# Patient Record
Sex: FEMALE | Race: WHITE | NOT HISPANIC OR LATINO | URBAN - METROPOLITAN AREA
[De-identification: names, ages, dates, MRNs, and addresses within clinical notes are randomized per-mention and may not be internally consistent; named-entity substitution may affect disease eponyms.]

---

## 2017-07-14 ENCOUNTER — INPATIENT (INPATIENT)
Facility: HOSPITAL | Age: 27
LOS: 0 days | Discharge: HOME | DRG: 976 | End: 2017-07-15
Attending: INTERNAL MEDICINE | Admitting: INTERNAL MEDICINE
Payer: MEDICAID

## 2017-07-14 VITALS
TEMPERATURE: 98 F | HEIGHT: 65 IN | HEART RATE: 69 BPM | SYSTOLIC BLOOD PRESSURE: 90 MMHG | OXYGEN SATURATION: 99 % | DIASTOLIC BLOOD PRESSURE: 54 MMHG | WEIGHT: 121.92 LBS | RESPIRATION RATE: 16 BRPM

## 2017-07-14 DIAGNOSIS — R10.9 UNSPECIFIED ABDOMINAL PAIN: ICD-10-CM

## 2017-07-14 DIAGNOSIS — E86.0 DEHYDRATION: ICD-10-CM

## 2017-07-14 DIAGNOSIS — Z29.9 ENCOUNTER FOR PROPHYLACTIC MEASURES, UNSPECIFIED: ICD-10-CM

## 2017-07-14 DIAGNOSIS — Z21 ASYMPTOMATIC HUMAN IMMUNODEFICIENCY VIRUS [HIV] INFECTION STATUS: ICD-10-CM

## 2017-07-14 DIAGNOSIS — B86 SCABIES: ICD-10-CM

## 2017-07-14 LAB
ALBUMIN SERPL ELPH-MCNC: 3.6 G/DL — SIGNIFICANT CHANGE UP (ref 3.5–5)
ALP SERPL-CCNC: 57 U/L — SIGNIFICANT CHANGE UP (ref 40–120)
ALT FLD-CCNC: 14 U/L DA — SIGNIFICANT CHANGE UP (ref 10–60)
ANION GAP SERPL CALC-SCNC: 7 MMOL/L — SIGNIFICANT CHANGE UP (ref 5–17)
APPEARANCE UR: CLEAR — SIGNIFICANT CHANGE UP
APTT BLD: 24 SEC — LOW (ref 27.5–37.4)
AST SERPL-CCNC: 18 U/L — SIGNIFICANT CHANGE UP (ref 10–40)
BILIRUB SERPL-MCNC: 0.4 MG/DL — SIGNIFICANT CHANGE UP (ref 0.2–1.2)
BILIRUB UR-MCNC: NEGATIVE — SIGNIFICANT CHANGE UP
BUN SERPL-MCNC: 10 MG/DL — SIGNIFICANT CHANGE UP (ref 7–18)
CALCIUM SERPL-MCNC: 9 MG/DL — SIGNIFICANT CHANGE UP (ref 8.4–10.5)
CHLORIDE SERPL-SCNC: 107 MMOL/L — SIGNIFICANT CHANGE UP (ref 96–108)
CO2 SERPL-SCNC: 25 MMOL/L — SIGNIFICANT CHANGE UP (ref 22–31)
COLOR SPEC: YELLOW — SIGNIFICANT CHANGE UP
CREAT SERPL-MCNC: 0.63 MG/DL — SIGNIFICANT CHANGE UP (ref 0.5–1.3)
DIFF PNL FLD: NEGATIVE — SIGNIFICANT CHANGE UP
GLUCOSE SERPL-MCNC: 79 MG/DL — SIGNIFICANT CHANGE UP (ref 70–99)
GLUCOSE UR QL: NEGATIVE — SIGNIFICANT CHANGE UP
HCT VFR BLD CALC: 31.2 % — LOW (ref 39–50)
HGB BLD-MCNC: 10.8 G/DL — LOW (ref 13–17)
INR BLD: 1.01 RATIO — SIGNIFICANT CHANGE UP (ref 0.88–1.16)
KETONES UR-MCNC: NEGATIVE — SIGNIFICANT CHANGE UP
LACTATE SERPL-SCNC: 1.3 MMOL/L — SIGNIFICANT CHANGE UP (ref 0.7–2)
LEUKOCYTE ESTERASE UR-ACNC: NEGATIVE — SIGNIFICANT CHANGE UP
LIDOCAIN IGE QN: 145 U/L — SIGNIFICANT CHANGE UP (ref 73–393)
MCHC RBC-ENTMCNC: 29 PG — SIGNIFICANT CHANGE UP (ref 27–34)
MCHC RBC-ENTMCNC: 34.5 GM/DL — SIGNIFICANT CHANGE UP (ref 32–36)
MCV RBC AUTO: 84 FL — SIGNIFICANT CHANGE UP (ref 80–100)
NITRITE UR-MCNC: NEGATIVE — SIGNIFICANT CHANGE UP
PCP SPEC-MCNC: SIGNIFICANT CHANGE UP
PH UR: 7 — SIGNIFICANT CHANGE UP (ref 5–8)
PLATELET # BLD AUTO: 151 K/UL — SIGNIFICANT CHANGE UP (ref 150–400)
POTASSIUM SERPL-MCNC: 3.7 MMOL/L — SIGNIFICANT CHANGE UP (ref 3.5–5.3)
POTASSIUM SERPL-SCNC: 3.7 MMOL/L — SIGNIFICANT CHANGE UP (ref 3.5–5.3)
PROT SERPL-MCNC: 10.4 G/DL — HIGH (ref 6–8.3)
PROT UR-MCNC: NEGATIVE — SIGNIFICANT CHANGE UP
PROTHROM AB SERPL-ACNC: 11 SEC — SIGNIFICANT CHANGE UP (ref 9.8–12.7)
RBC # BLD: 3.71 M/UL — LOW (ref 4.2–5.8)
RBC # FLD: 15.3 % — HIGH (ref 10.3–14.5)
SODIUM SERPL-SCNC: 139 MMOL/L — SIGNIFICANT CHANGE UP (ref 135–145)
SP GR SPEC: 1.01 — SIGNIFICANT CHANGE UP (ref 1.01–1.02)
UROBILINOGEN FLD QL: NEGATIVE — SIGNIFICANT CHANGE UP
WBC # BLD: 3.6 K/UL — LOW (ref 3.8–10.5)
WBC # FLD AUTO: 3.6 K/UL — LOW (ref 3.8–10.5)

## 2017-07-14 PROCEDURE — 71010: CPT | Mod: 26

## 2017-07-14 PROCEDURE — 99285 EMERGENCY DEPT VISIT HI MDM: CPT | Mod: 25

## 2017-07-14 PROCEDURE — 74176 CT ABD & PELVIS W/O CONTRAST: CPT | Mod: 26

## 2017-07-14 RX ORDER — OXYCODONE AND ACETAMINOPHEN 5; 325 MG/1; MG/1
1 TABLET ORAL ONCE
Refills: 0 | Status: DISCONTINUED | OUTPATIENT
Start: 2017-07-14 | End: 2017-07-14

## 2017-07-14 RX ORDER — ONDANSETRON 8 MG/1
4 TABLET, FILM COATED ORAL ONCE
Refills: 0 | Status: COMPLETED | OUTPATIENT
Start: 2017-07-14 | End: 2017-07-14

## 2017-07-14 RX ORDER — PERMETHRIN CREAM 5% W/W 50 MG/G
1 CREAM TOPICAL ONCE
Refills: 0 | Status: COMPLETED | OUTPATIENT
Start: 2017-07-14 | End: 2017-07-14

## 2017-07-14 RX ORDER — ACETAMINOPHEN 500 MG
650 TABLET ORAL EVERY 6 HOURS
Refills: 0 | Status: DISCONTINUED | OUTPATIENT
Start: 2017-07-14 | End: 2017-07-15

## 2017-07-14 RX ORDER — SODIUM CHLORIDE 9 MG/ML
500 INJECTION INTRAMUSCULAR; INTRAVENOUS; SUBCUTANEOUS
Refills: 0 | Status: DISCONTINUED | OUTPATIENT
Start: 2017-07-14 | End: 2017-07-14

## 2017-07-14 RX ORDER — ACETAMINOPHEN WITH CODEINE 300MG-30MG
2 TABLET ORAL EVERY 4 HOURS
Refills: 0 | Status: DISCONTINUED | OUTPATIENT
Start: 2017-07-14 | End: 2017-07-15

## 2017-07-14 RX ORDER — SODIUM CHLORIDE 9 MG/ML
1000 INJECTION INTRAMUSCULAR; INTRAVENOUS; SUBCUTANEOUS
Refills: 0 | Status: DISCONTINUED | OUTPATIENT
Start: 2017-07-14 | End: 2017-07-15

## 2017-07-14 RX ORDER — ENOXAPARIN SODIUM 100 MG/ML
40 INJECTION SUBCUTANEOUS DAILY
Refills: 0 | Status: DISCONTINUED | OUTPATIENT
Start: 2017-07-14 | End: 2017-07-15

## 2017-07-14 RX ORDER — SODIUM CHLORIDE 9 MG/ML
3 INJECTION INTRAMUSCULAR; INTRAVENOUS; SUBCUTANEOUS ONCE
Refills: 0 | Status: COMPLETED | OUTPATIENT
Start: 2017-07-14 | End: 2017-07-14

## 2017-07-14 RX ORDER — DIPHENHYDRAMINE HCL 50 MG
25 CAPSULE ORAL ONCE
Refills: 0 | Status: COMPLETED | OUTPATIENT
Start: 2017-07-14 | End: 2017-07-14

## 2017-07-14 RX ADMIN — Medication 2 TABLET(S): at 22:31

## 2017-07-14 RX ADMIN — PERMETHRIN CREAM 5% W/W 1 APPLICATION(S): 50 CREAM TOPICAL at 22:32

## 2017-07-14 RX ADMIN — SODIUM CHLORIDE 2000 MILLILITER(S): 9 INJECTION INTRAMUSCULAR; INTRAVENOUS; SUBCUTANEOUS at 07:53

## 2017-07-14 RX ADMIN — ONDANSETRON 4 MILLIGRAM(S): 8 TABLET, FILM COATED ORAL at 07:51

## 2017-07-14 RX ADMIN — OXYCODONE AND ACETAMINOPHEN 1 TABLET(S): 5; 325 TABLET ORAL at 13:00

## 2017-07-14 RX ADMIN — OXYCODONE AND ACETAMINOPHEN 1 TABLET(S): 5; 325 TABLET ORAL at 09:23

## 2017-07-14 RX ADMIN — OXYCODONE AND ACETAMINOPHEN 1 TABLET(S): 5; 325 TABLET ORAL at 07:51

## 2017-07-14 RX ADMIN — Medication 25 MILLIGRAM(S): at 08:01

## 2017-07-14 RX ADMIN — Medication 2 TABLET(S): at 17:41

## 2017-07-14 RX ADMIN — SODIUM CHLORIDE 2000 MILLILITER(S): 9 INJECTION INTRAMUSCULAR; INTRAVENOUS; SUBCUTANEOUS at 07:54

## 2017-07-14 RX ADMIN — SODIUM CHLORIDE 3 MILLILITER(S): 9 INJECTION INTRAMUSCULAR; INTRAVENOUS; SUBCUTANEOUS at 07:46

## 2017-07-14 RX ADMIN — Medication 2 TABLET(S): at 23:15

## 2017-07-14 RX ADMIN — Medication 2 TABLET(S): at 18:35

## 2017-07-14 RX ADMIN — OXYCODONE AND ACETAMINOPHEN 1 TABLET(S): 5; 325 TABLET ORAL at 14:00

## 2017-07-14 NOTE — H&P ADULT - PROBLEM SELECTOR PLAN 3
Patient has exfoliating lesion on b/l hands  Patient is home less and has facial rash.   Permethrin rinse once to whole body

## 2017-07-14 NOTE — PHYSICAL THERAPY INITIAL EVALUATION ADULT - CRITERIA FOR SKILLED THERAPEUTIC INTERVENTIONS
impairments found/functional limitations in following categories/risk reduction/prevention/rehab potential/therapy frequency/predicted duration of therapy intervention/anticipated equipment needs at discharge/anticipated discharge recommendation

## 2017-07-14 NOTE — PHYSICAL THERAPY INITIAL EVALUATION ADULT - PERTINENT HX OF CURRENT PROBLEM, REHAB EVAL
Pt. admitted due to abdominal pain x 2 weeks already. Pt. is homeless and has electric chair to use to get around. Pt. has hx of HIV (since birth) and no meds'; Lymphoma diagnosed on 2012, seizures, Shingles and Viral Meningitis.

## 2017-07-14 NOTE — PHYSICAL THERAPY INITIAL EVALUATION ADULT - PATIENT/FAMILY/SIGNIFICANT OTHER GOALS STATEMENT, PT EVAL
Pt. states wants to have a place to live and  to gain however much strength to help himself and enjoy life.

## 2017-07-14 NOTE — H&P ADULT - NSHPLABSRESULTS_GEN_ALL_CORE
10.8   3.6   )-----------( 151      ( 2017 11:05 )             31.2   07-14    139  |  107  |  10  ----------------------------<  79  3.7   |  25  |  0.63    Ca    9.0      2017 07:53    TPro  10.4<H>  /  Alb  3.6  /  TBili  0.4  /  DBili  x   /  AST  18  /  ALT  14  /  AlkPhos  57  07-14    Urinalysis Basic - ( 2017 06:49 )    Color: Yellow / Appearance: Clear / S.010 / pH: x  Gluc: x / Ketone: Negative  / Bili: Negative / Urobili: Negative   Blood: x / Protein: Negative / Nitrite: Negative   Leuk Esterase: Negative / RBC: x / WBC x   Sq Epi: x / Non Sq Epi: x / Bacteria: x

## 2017-07-14 NOTE — H&P ADULT - PROBLEM SELECTOR PLAN 1
Likely secondary to lymphoma or HIV  Percocet/Tylenol for pain  Avoid morphine as BP runs in 100's  F/u hepatitis panel, Syphilis screen, cryptococcal antigen and QuantiFeron as patient is immunocompromised  Patient is tolerating Diet well.   F/u CT abdomen to rule out metastasis

## 2017-07-14 NOTE — H&P ADULT - ASSESSMENT
27 year old homeless wheel chair M with PMH of HIV (since birth, not on medication), Lymphoma (diagnosed in 2012, got treatment with chemo port, not sure if completed or not, seizures (was discharged on dilantin but never took it), shingles, LE contractures and viral meningitis presented to ED with abdominal pain. Patient states that he is having abdominal pain since last 2 months, not associated with food. Patient also complains of subjective fever, chills, dry cough. Patient never documented the fever and states that his BP runs in 100's. Denies h/o headache, nausea, vomiting, sick contacts, travel outside [place], constipation or diarrhea.

## 2017-07-14 NOTE — H&P ADULT - HISTORY OF PRESENT ILLNESS
27 year old homeless wheel chair M with PMH of HIV (since birth, not on medication), Lymphoma (diagnosed in 2012, got treatment with chemo port, not sure if completed or not, seizures (was discharged on dilantin but never took it), shingles, LE contractures and viral meningitis presented to ED with abdominal pain. Patient states that he is having abdominal pain since last 2 months, not associated with food. Patient also complains of subjective fever, chills, dry cough. Patient never documented the fever and states that his BP runs in 100's. Denies h/o headache, nausea, vomiting, sick contacts, travel outside [place], constipation or diarrhea.  Denies smoking, alcohol and illicit drug use. No PCP    ED course: Patient is comfortably lying on bed, not in acute respiratory distress s/p 2L bolus. 27 year old homeless wheel chair M with PMH of HIV (since birth, not on medication), Lymphoma (diagnosed in 2012, got treatment with chemo port, not sure if completed or not, seizures (was discharged on dilantin but never took it), shingles, LE contractures and viral meningitis presented to ED with abdominal pain. Patient states that he is having abdominal pain since last 2 months, not associated with food. Patient also complains of subjective fever, chills, dry cough. Patient never documented the fever and states that his BP runs in 100's.   Denies h/o headache, nausea, vomiting, sick contacts, travel outside [place], constipation or diarrhea.  Denies smoking, alcohol and illicit drug use. No PCP    ED course: Patient is comfortably lying on bed, not in acute respiratory distress s/p 2L bolus.

## 2017-07-14 NOTE — PHYSICAL THERAPY INITIAL EVALUATION ADULT - GENERAL OBSERVATIONS, REHAB EVAL
Pt. seen for PT Eval; pt. received in side lying; (+) IV line; Pt. presents w/ bilateral knee flexion contracture; pain on left abdominal area.

## 2017-07-14 NOTE — ED PROVIDER NOTE - MEDICAL DECISION MAKING DETAILS
28 y/o M pt presents with unmanaged HIV, likely AIDS. Pt is homeless without any social or medical support. Will check labs, CT scans, give fluids, likely admission.

## 2017-07-14 NOTE — H&P ADULT - PROBLEM SELECTOR PLAN 2
H/o HIV since birth, not on any medication  F/u CD4 count, HIV viral load  F/u hepatitis panel, Syphilis screen, cryptococcal antigen and QuantiFeron as patient is immunocompromised

## 2017-07-14 NOTE — H&P ADULT - ATTENDING COMMENTS
I have interviewed and examined this patient at the bedside and discussed his care with Dr. Galvez.   He is a 28 yo man who gives a history of HIV infection since birth.  He was raised by his grandmother, who is now .  He gives a history of several complications of HIV infection, including pneumonia and lymphoma.  He states that he was treated at several different hospitals, but has not followed up with outpatient care.  He has not been on antiretrovirals since .     He is an alert, articulate 28 yo man who disheveled and dirty, but in NAD  Vital Signs Last 24 Hrs  T(C): 36.4 (2017 17:19), Max: 36.8 (2017 07:33)  T(F): 97.6 (2017 17:19), Max: 98.3 (2017 07:33)  HR: 70 (2017 17:19) (59 - 70)  BP: 89/49 (2017 17:19) (89/49 - 95/53)  BP(mean): 57 (2017 17:19) (57 - 57)  RR: 16 (2017 17:19) (16 - 17)  SpO2: 100% (2017 17:19) (98% - 100%)  Skin:  irregular pigmentation dark and light, exfoliation of skin on his hands and intertriginous areas of the feet.     Poor dentition  Lungs, clear  Cor, RRR  Nodes, none appreciated  Abdomen, midline scar, healed by secondary intention  Neurolgical, contractures, LE    IMP:  History of HIV.  Needs confirmation and history of lymphoma treatment.           Undomiciled           Contracture deformity of LE, ? history of neurologic disease           Skin lesions, r/o scabies, syphilis           Nutritional deficiency is likely.  Plan:  Baseline studies for HIV associated infections.            History, records, if possible.            Nutritional parameters.           SW consult.            Consult PT           Treat empirically for scabies.

## 2017-07-14 NOTE — ED PROVIDER NOTE - OBJECTIVE STATEMENT
26 y/o M pt (wheelchair-bound) with PMHx of HIV and Lymphoma and no significant PSHx presents to ED c/o intermittent (waxing and waning in intensity) subjective fever, chills, abd pain, weight loss, cough, and nausea for a long period of time (>1 year). Pt reports he is homeless, receives no medical care, and has never been to [facility]; pt generally stays in [place], however tonight, he decided to visit [facility] instead of his usual place of care for unclear reasons. Pt states he usually improves after being hospitalized; at institutions the pt has been to in the past, pt has been d/c however he has never f/u with medical or , resulting in ultimate decompensation until the pt requires admission again. Pt denies any other complaints. Allergies: Levaquin (unknown).

## 2017-07-15 ENCOUNTER — EMERGENCY (EMERGENCY)
Facility: HOSPITAL | Age: 27
LOS: 1 days | Discharge: HOME | End: 2017-07-15
Payer: MEDICAID

## 2017-07-15 VITALS
OXYGEN SATURATION: 100 % | RESPIRATION RATE: 16 BRPM | HEART RATE: 78 BPM | TEMPERATURE: 100 F | SYSTOLIC BLOOD PRESSURE: 103 MMHG | DIASTOLIC BLOOD PRESSURE: 56 MMHG

## 2017-07-15 VITALS
HEART RATE: 77 BPM | SYSTOLIC BLOOD PRESSURE: 94 MMHG | HEIGHT: 67 IN | WEIGHT: 100.09 LBS | OXYGEN SATURATION: 98 % | DIASTOLIC BLOOD PRESSURE: 59 MMHG | TEMPERATURE: 99 F | RESPIRATION RATE: 20 BRPM

## 2017-07-15 VITALS
HEART RATE: 74 BPM | RESPIRATION RATE: 16 BRPM | OXYGEN SATURATION: 99 % | DIASTOLIC BLOOD PRESSURE: 64 MMHG | SYSTOLIC BLOOD PRESSURE: 110 MMHG | TEMPERATURE: 99 F

## 2017-07-15 DIAGNOSIS — Z88.1 ALLERGY STATUS TO OTHER ANTIBIOTIC AGENTS STATUS: ICD-10-CM

## 2017-07-15 DIAGNOSIS — G89.29 OTHER CHRONIC PAIN: ICD-10-CM

## 2017-07-15 DIAGNOSIS — Z21 ASYMPTOMATIC HUMAN IMMUNODEFICIENCY VIRUS [HIV] INFECTION STATUS: ICD-10-CM

## 2017-07-15 DIAGNOSIS — R26.2 DIFFICULTY IN WALKING, NOT ELSEWHERE CLASSIFIED: ICD-10-CM

## 2017-07-15 DIAGNOSIS — K08.89 OTHER SPECIFIED DISORDERS OF TEETH AND SUPPORTING STRUCTURES: ICD-10-CM

## 2017-07-15 LAB
ALBUMIN SERPL ELPH-MCNC: 3.2 G/DL — LOW (ref 3.5–5)
ALP SERPL-CCNC: 49 U/L — SIGNIFICANT CHANGE UP (ref 40–120)
ALT FLD-CCNC: 12 U/L DA — SIGNIFICANT CHANGE UP (ref 10–60)
ANION GAP SERPL CALC-SCNC: 6 MMOL/L — SIGNIFICANT CHANGE UP (ref 5–17)
AST SERPL-CCNC: 13 U/L — SIGNIFICANT CHANGE UP (ref 10–40)
BILIRUB SERPL-MCNC: 0.3 MG/DL — SIGNIFICANT CHANGE UP (ref 0.2–1.2)
BUN SERPL-MCNC: 12 MG/DL — SIGNIFICANT CHANGE UP (ref 7–18)
CALCIUM SERPL-MCNC: 8.3 MG/DL — LOW (ref 8.4–10.5)
CHLORIDE SERPL-SCNC: 108 MMOL/L — SIGNIFICANT CHANGE UP (ref 96–108)
CHOLEST SERPL-MCNC: 130 MG/DL — SIGNIFICANT CHANGE UP (ref 10–199)
CO2 SERPL-SCNC: 27 MMOL/L — SIGNIFICANT CHANGE UP (ref 22–31)
CREAT SERPL-MCNC: 0.63 MG/DL — SIGNIFICANT CHANGE UP (ref 0.5–1.3)
CRYPTOC AG FLD QL: NEGATIVE — SIGNIFICANT CHANGE UP
CULTURE RESULTS: NO GROWTH — SIGNIFICANT CHANGE UP
ERYTHROCYTE [SEDIMENTATION RATE] IN BLOOD: 64 MM/HR — HIGH (ref 0–15)
GLUCOSE SERPL-MCNC: 109 MG/DL — HIGH (ref 70–99)
HAV IGG+IGM SER QL: SIGNIFICANT CHANGE UP
HBA1C BLD-MCNC: 5.1 % — SIGNIFICANT CHANGE UP (ref 4–5.6)
HBV CORE AB SER-ACNC: SIGNIFICANT CHANGE UP
HBV SURFACE AB SER-ACNC: SIGNIFICANT CHANGE UP
HBV SURFACE AG SER-ACNC: SIGNIFICANT CHANGE UP
HCT VFR BLD CALC: 31.5 % — LOW (ref 39–50)
HCV AB S/CO SERPL IA: 0.14 S/CO — SIGNIFICANT CHANGE UP
HCV AB SERPL-IMP: SIGNIFICANT CHANGE UP
HDLC SERPL-MCNC: 28 MG/DL — LOW (ref 40–125)
HGB BLD-MCNC: 10.6 G/DL — LOW (ref 13–17)
LIPID PNL WITH DIRECT LDL SERPL: 87 MG/DL — SIGNIFICANT CHANGE UP
MAGNESIUM SERPL-MCNC: 2.5 MG/DL — SIGNIFICANT CHANGE UP (ref 1.6–2.6)
MCHC RBC-ENTMCNC: 28.7 PG — SIGNIFICANT CHANGE UP (ref 27–34)
MCHC RBC-ENTMCNC: 33.8 GM/DL — SIGNIFICANT CHANGE UP (ref 32–36)
MCV RBC AUTO: 84.9 FL — SIGNIFICANT CHANGE UP (ref 80–100)
PHOSPHATE SERPL-MCNC: 2.8 MG/DL — SIGNIFICANT CHANGE UP (ref 2.5–4.5)
PLATELET # BLD AUTO: 174 K/UL — SIGNIFICANT CHANGE UP (ref 150–400)
POTASSIUM SERPL-MCNC: 3.8 MMOL/L — SIGNIFICANT CHANGE UP (ref 3.5–5.3)
POTASSIUM SERPL-SCNC: 3.8 MMOL/L — SIGNIFICANT CHANGE UP (ref 3.5–5.3)
PROT SERPL-MCNC: 9.3 G/DL — HIGH (ref 6–8.3)
RBC # BLD: 3.71 M/UL — LOW (ref 4.2–5.8)
RBC # FLD: 15.4 % — HIGH (ref 10.3–14.5)
SODIUM SERPL-SCNC: 141 MMOL/L — SIGNIFICANT CHANGE UP (ref 135–145)
SPECIMEN SOURCE: SIGNIFICANT CHANGE UP
T PALLIDUM AB TITR SER: NEGATIVE — SIGNIFICANT CHANGE UP
TOTAL CHOLESTEROL/HDL RATIO MEASUREMENT: 4.6 RATIO — SIGNIFICANT CHANGE UP (ref 3.4–9.6)
TRIGL SERPL-MCNC: 73 MG/DL — SIGNIFICANT CHANGE UP (ref 10–149)
TSH SERPL-MCNC: 1.16 UU/ML — SIGNIFICANT CHANGE UP (ref 0.34–4.82)
WBC # BLD: 3.8 K/UL — SIGNIFICANT CHANGE UP (ref 3.8–10.5)
WBC # FLD AUTO: 3.8 K/UL — SIGNIFICANT CHANGE UP (ref 3.8–10.5)

## 2017-07-15 PROCEDURE — 99283 EMERGENCY DEPT VISIT LOW MDM: CPT | Mod: 25

## 2017-07-15 PROCEDURE — 96372 THER/PROPH/DIAG INJ SC/IM: CPT

## 2017-07-15 PROCEDURE — 99285 EMERGENCY DEPT VISIT HI MDM: CPT | Mod: 25

## 2017-07-15 PROCEDURE — 99284 EMERGENCY DEPT VISIT MOD MDM: CPT

## 2017-07-15 RX ORDER — IBUPROFEN 200 MG
600 TABLET ORAL ONCE
Refills: 0 | Status: COMPLETED | OUTPATIENT
Start: 2017-07-15 | End: 2017-07-15

## 2017-07-15 RX ORDER — KETOROLAC TROMETHAMINE 30 MG/ML
15 SYRINGE (ML) INJECTION ONCE
Refills: 0 | Status: DISCONTINUED | OUTPATIENT
Start: 2017-07-15 | End: 2017-07-15

## 2017-07-15 RX ORDER — DIPHENHYDRAMINE HCL 50 MG
25 CAPSULE ORAL ONCE
Refills: 0 | Status: COMPLETED | OUTPATIENT
Start: 2017-07-15 | End: 2017-07-15

## 2017-07-15 RX ADMIN — Medication 15 MILLIGRAM(S): at 18:35

## 2017-07-15 RX ADMIN — Medication 25 MILLIGRAM(S): at 00:56

## 2017-07-15 RX ADMIN — Medication 600 MILLIGRAM(S): at 11:52

## 2017-07-15 RX ADMIN — Medication 600 MILLIGRAM(S): at 10:53

## 2017-07-15 RX ADMIN — Medication 15 MILLIGRAM(S): at 19:10

## 2017-07-15 NOTE — DISCHARGE NOTE ADULT - HOSPITAL COURSE
27 year old homeless wheel chair M with PMH of HIV (since birth, not on medication), Lymphoma (diagnosed in 2012, got treatment with chemo port, not sure if completed or not, seizures (was discharged on dilantin but never took it), shingles, LE contractures and viral meningitis presented to ED with abdominal pain. Patient states that he is having abdominal pain since last 2 months, not associated with food. Patient also complains of subjective fever, chills, dry cough. Patient never documented the fever and states that his BP runs in 100's.   Denies h/o headache, nausea, vomiting, sick contacts, travel outside [place], constipation or diarrhea.  Denies smoking, alcohol and illicit drug use. No PCP   Abdominal pain was addressed by Percocet/Tylenol for pain; morphine avoided as BP runs in 100's! Abdominal CT was negative for any abdominal pathology.  inpatient enoxaparin was given for dvt prophylaxis.  Patient is stable and ready to be discharged.

## 2017-07-15 NOTE — DISCHARGE NOTE ADULT - CARE PLAN
Principal Discharge DX:	Abdominal pain, unspecified location  Goal:	Abdominal CT was done and was negative for any abdominal pathology  Instructions for follow-up, activity and diet:	patient does not have a PCP and hence was advised to come to the ER if anything similar happens in the future.

## 2017-07-15 NOTE — DISCHARGE NOTE ADULT - PLAN OF CARE
Abdominal CT was done and was negative for any abdominal pathology patient does not have a PCP and hence was advised to come to the ER if anything similar happens in the future.

## 2017-07-15 NOTE — ED PROVIDER NOTE - MEDICAL DECISION MAKING DETAILS
Pt w/ back and neck pain requesting morphine; explained that opiates w/ not be prescribed for chronic pain. Will give Toradol for tooth and neck pain. Due to pt ambulatory dysfunction, will set up Amb[facility] to assist pt getting home.

## 2017-07-15 NOTE — ED PROVIDER NOTE - PROGRESS NOTE DETAILS
The scribes documentation has been prepared under my direct and personally reviewed by me in its entirety. I confirm that the note above accurately reflects all work, treatment, procedures, and medical decision making performed by me [Ashley Joshua NP].

## 2017-07-15 NOTE — ED ADULT NURSE NOTE - OBJECTIVE STATEMENT
c/o neck and back pain patient on electric wheel chair, was discharged from hospital today came back c/o pain, denies any recent injury

## 2017-07-15 NOTE — ED ADULT NURSE NOTE - CHIEF COMPLAINT QUOTE
Pt c/o pain in mouth and back and neck pain. No recent injury. Has motorized wheelchair. Was just discharged from [address] [place] [address]. Was demanding Morphine at that time. Was discharged.

## 2017-07-15 NOTE — ED ADULT TRIAGE NOTE - CHIEF COMPLAINT QUOTE
Pt c/o pain in mouth and back and neck pain. No recent injury. Has motorized wheelchair. Was just discharged from [address] [place] [address]. Was demanding Morphine at that time. Was discharged.

## 2017-07-15 NOTE — DISCHARGE NOTE ADULT - PATIENT PORTAL LINK FT
“You can access the [facility] Patient Portal, offered by [facility], by registering with the following website: [email or web address]”

## 2017-07-15 NOTE — ED PROVIDER NOTE - OBJECTIVE STATEMENT
26 y/o M pt, wheelchair bound due to contractors, w/ PMHx of HIV and lymphoma returns to the ED same day of discharge c/o back and neck pain. Pt was in the hospital yesterday for abd. pain, nausea and vomiting; returns today for back and neck pain. Pt requesting assistance to get home, stating that he was not set up w/ means to get home yesterday. Pt was not sent home w/ pain medicine. Denies trauma, bowel/bladder dysfunction,  weakness, weight loss, saddle anesthesia or any other complaints. NKDA. 28 y/o M pt, wheelchair bound due to contractions, w/ PMHx of HIV and lymphoma returns to the ED same day of discharge c/o chronic neck, back and tooth pain. Pt was in the hospital yesterday for abd. pain, nausea and vomiting, d/c home. Pt requesting assistance to get home, stating that he was not set up w/ means to get home yesterday. Pt also c/o not being sent home w/ pain medicine. Denies trauma, bowel/bladder dysfunction,  weakness, weight loss, saddle anesthesia or any other complaints. NKDA.

## 2017-07-15 NOTE — ED PROVIDER NOTE - PHYSICAL EXAMINATION
Pt wheelchair bound, poor dentition, multiple caries, missing teeth, lower extremity contractors b/l

## 2017-07-16 LAB
HIV-1 VIRAL LOAD RESULT: ABNORMAL
HIV1 RNA # SERPL NAA+PROBE: SIGNIFICANT CHANGE UP
HIV1 RNA SER-IMP: SIGNIFICANT CHANGE UP
HIV1 RNA SERPL NAA+PROBE-ACNC: ABNORMAL
HIV1 RNA SERPL NAA+PROBE-LOG#: 5.35 — SIGNIFICANT CHANGE UP

## 2017-07-17 LAB
4/8 RATIO: 0.17 RATIO — LOW (ref 0.9–3.6)
ABS CD8: 842 /UL — HIGH (ref 136–757)
CD16+CD56+ CELLS NFR BLD: 10 % — SIGNIFICANT CHANGE UP (ref 4–25)
CD16+CD56+ CELLS NFR SPEC: 144 /UL — SIGNIFICANT CHANGE UP (ref 64–494)
CD19 BLASTS SPEC-ACNC: 24 % — HIGH (ref 5–22)
CD19 BLASTS SPEC-ACNC: 341 /UL — SIGNIFICANT CHANGE UP (ref 68–528)
CD3 BLASTS SPEC-ACNC: 66 % — SIGNIFICANT CHANGE UP (ref 59–85)
CD3 BLASTS SPEC-ACNC: 974 /UL — SIGNIFICANT CHANGE UP (ref 799–2171)
CD4 %: 9 % — LOW (ref 36–65)
CD8 %: 56 % — HIGH (ref 11–36)
HIV 1+2 AB+HIV1 P24 AG SERPL QL IA: REACTIVE
T-CELL CD4 SUBSET PNL BLD: 140 /UL — LOW (ref 489–1457)

## 2017-07-18 DIAGNOSIS — Z99.3 DEPENDENCE ON WHEELCHAIR: ICD-10-CM

## 2017-07-18 DIAGNOSIS — Z88.1 ALLERGY STATUS TO OTHER ANTIBIOTIC AGENTS STATUS: ICD-10-CM

## 2017-07-18 DIAGNOSIS — R10.9 UNSPECIFIED ABDOMINAL PAIN: ICD-10-CM

## 2017-07-18 DIAGNOSIS — Z92.21 PERSONAL HISTORY OF ANTINEOPLASTIC CHEMOTHERAPY: ICD-10-CM

## 2017-07-18 DIAGNOSIS — E86.0 DEHYDRATION: ICD-10-CM

## 2017-07-18 DIAGNOSIS — C85.90 NON-HODGKIN LYMPHOMA, UNSPECIFIED, UNSPECIFIED SITE: ICD-10-CM

## 2017-07-18 DIAGNOSIS — Z59.0 HOMELESSNESS: ICD-10-CM

## 2017-07-18 DIAGNOSIS — B86 SCABIES: ICD-10-CM

## 2017-07-18 DIAGNOSIS — Z21 ASYMPTOMATIC HUMAN IMMUNODEFICIENCY VIRUS [HIV] INFECTION STATUS: ICD-10-CM

## 2017-07-18 LAB
M TB TUBERC IFN-G BLD QL: 0 IU/ML — SIGNIFICANT CHANGE UP
M TB TUBERC IFN-G BLD QL: 0.04 IU/ML — SIGNIFICANT CHANGE UP
M TB TUBERC IFN-G BLD QL: NEGATIVE — SIGNIFICANT CHANGE UP
MITOGEN IGNF BCKGRD COR BLD-ACNC: 3.34 IU/ML — SIGNIFICANT CHANGE UP

## 2017-07-18 SDOH — ECONOMIC STABILITY - HOUSING INSECURITY: HOMELESSNESS: Z59.0

## 2017-07-19 DIAGNOSIS — B20 HUMAN IMMUNODEFICIENCY VIRUS [HIV] DISEASE: ICD-10-CM

## 2017-07-19 LAB
CULTURE RESULTS: SIGNIFICANT CHANGE UP
CULTURE RESULTS: SIGNIFICANT CHANGE UP
SPECIMEN SOURCE: SIGNIFICANT CHANGE UP
SPECIMEN SOURCE: SIGNIFICANT CHANGE UP

## 2017-12-18 PROCEDURE — 87340 HEPATITIS B SURFACE AG IA: CPT

## 2017-12-18 PROCEDURE — 93005 ELECTROCARDIOGRAM TRACING: CPT

## 2017-12-18 PROCEDURE — 86803 HEPATITIS C AB TEST: CPT

## 2017-12-18 PROCEDURE — 87040 BLOOD CULTURE FOR BACTERIA: CPT

## 2017-12-18 PROCEDURE — 87086 URINE CULTURE/COLONY COUNT: CPT

## 2017-12-18 PROCEDURE — 85027 COMPLETE CBC AUTOMATED: CPT

## 2017-12-18 PROCEDURE — 87536 HIV-1 QUANT&REVRSE TRNSCRPJ: CPT

## 2017-12-18 PROCEDURE — 85730 THROMBOPLASTIN TIME PARTIAL: CPT

## 2017-12-18 PROCEDURE — 86357 NK CELLS TOTAL COUNT: CPT

## 2017-12-18 PROCEDURE — 86706 HEP B SURFACE ANTIBODY: CPT

## 2017-12-18 PROCEDURE — 86403 PARTICLE AGGLUT ANTBDY SCRN: CPT

## 2017-12-18 PROCEDURE — 86480 TB TEST CELL IMMUN MEASURE: CPT

## 2017-12-18 PROCEDURE — 71045 X-RAY EXAM CHEST 1 VIEW: CPT

## 2017-12-18 PROCEDURE — 81003 URINALYSIS AUTO W/O SCOPE: CPT

## 2017-12-18 PROCEDURE — 99285 EMERGENCY DEPT VISIT HI MDM: CPT | Mod: 25

## 2017-12-18 PROCEDURE — 74176 CT ABD & PELVIS W/O CONTRAST: CPT

## 2017-12-18 PROCEDURE — 80053 COMPREHEN METABOLIC PANEL: CPT

## 2017-12-18 PROCEDURE — 86704 HEP B CORE ANTIBODY TOTAL: CPT

## 2017-12-18 PROCEDURE — 86780 TREPONEMA PALLIDUM: CPT

## 2017-12-18 PROCEDURE — 36415 COLL VENOUS BLD VENIPUNCTURE: CPT

## 2017-12-18 PROCEDURE — 83036 HEMOGLOBIN GLYCOSYLATED A1C: CPT

## 2017-12-18 PROCEDURE — 87389 HIV-1 AG W/HIV-1&-2 AB AG IA: CPT

## 2017-12-18 PROCEDURE — 83690 ASSAY OF LIPASE: CPT

## 2017-12-18 PROCEDURE — 80061 LIPID PANEL: CPT

## 2017-12-18 PROCEDURE — 83605 ASSAY OF LACTIC ACID: CPT

## 2017-12-18 PROCEDURE — 86355 B CELLS TOTAL COUNT: CPT

## 2017-12-18 PROCEDURE — 86708 HEPATITIS A ANTIBODY: CPT

## 2017-12-18 PROCEDURE — 80307 DRUG TEST PRSMV CHEM ANLYZR: CPT

## 2017-12-18 PROCEDURE — 83735 ASSAY OF MAGNESIUM: CPT

## 2017-12-18 PROCEDURE — 84443 ASSAY THYROID STIM HORMONE: CPT

## 2017-12-18 PROCEDURE — 85652 RBC SED RATE AUTOMATED: CPT

## 2017-12-18 PROCEDURE — 86702 HIV-2 ANTIBODY: CPT

## 2017-12-18 PROCEDURE — 97162 PT EVAL MOD COMPLEX 30 MIN: CPT

## 2017-12-18 PROCEDURE — 86701 HIV-1ANTIBODY: CPT

## 2017-12-18 PROCEDURE — 85610 PROTHROMBIN TIME: CPT

## 2017-12-18 PROCEDURE — G0378: CPT

## 2017-12-18 PROCEDURE — 84100 ASSAY OF PHOSPHORUS: CPT

## 2018-12-10 NOTE — ED ADULT TRIAGE NOTE - NSWEIGHTCALCTOOLDRUG_GEN_A_CORE
"""S/P IOL OU: OD: Tecnis ZCB00 15.5 (Target: Distance)Femto/ArcsOmidria. OS: Tecnis ZCB00 18.5 (ORA) (Target: Near) +ORA/Arcs. MRx given today. "  used

## 2022-10-19 NOTE — PATIENT PROFILE ADULT. - NS PRO CONTRA FLU 1
-- DO NOT REPLY / DO NOT REPLY ALL --  -- Message is from [facility] [employer] ([facility]) --    General Patient Message     Patient has appointment right now at 1220 but never got a call or any outreach she is waiting to speak to dr donny Vuong/ please reach out as she is still waiting for her virtual appointment    Caller Information       Type Contact Phone/Fax    10/19/2022 12:26 PM CDT Phone (Incoming) Tarsha Lisa (Self) [phone] (M)        Alternative phone number: n/a    Can a detailed message be left? Yes    Message Turnaround:     Is it Working Hours? Yes - Working Hours     [place]:    Please give this turnaround time to the caller:   \"This message will be sent to [state Provider's name]. The clinical team will fulfill your request as soon as they review your message.\"                
out of season (available sept 1 thru apr 2 only)
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
